# Patient Record
Sex: FEMALE | Race: WHITE | NOT HISPANIC OR LATINO | URBAN - METROPOLITAN AREA
[De-identification: names, ages, dates, MRNs, and addresses within clinical notes are randomized per-mention and may not be internally consistent; named-entity substitution may affect disease eponyms.]

---

## 2017-08-18 ENCOUNTER — IMPORTED ENCOUNTER (OUTPATIENT)
Dept: URBAN - METROPOLITAN AREA CLINIC 50 | Facility: CLINIC | Age: 57
End: 2017-08-18

## 2017-08-24 ENCOUNTER — IMPORTED ENCOUNTER (OUTPATIENT)
Dept: URBAN - METROPOLITAN AREA CLINIC 50 | Facility: CLINIC | Age: 57
End: 2017-08-24

## 2018-05-31 ENCOUNTER — IMPORTED ENCOUNTER (OUTPATIENT)
Dept: URBAN - METROPOLITAN AREA CLINIC 50 | Facility: CLINIC | Age: 58
End: 2018-05-31

## 2019-08-29 ENCOUNTER — IMPORTED ENCOUNTER (OUTPATIENT)
Dept: URBAN - METROPOLITAN AREA CLINIC 50 | Facility: CLINIC | Age: 59
End: 2019-08-29

## 2021-05-15 ASSESSMENT — VISUAL ACUITY
OD_BAT: 20/30
OS_CC: 20/20
OD_OTHER: 20/20. 20/20.
OS_BAT: 20/30
OS_OTHER: 20/30. 20/30.
OD_CC: 20/20
OS_PH: @ 18 IN
OD_CC: 20/20
OS_CC: J1+
OD_CC: J1+
OD_CC: 20/20
OS_CC: J1+
OD_OTHER: 20/30. 20/30.
OS_CC: 20/20
OD_CC: J1+@ 18 IN
OD_PH: @ 18 IN
OS_OTHER: 20/20. 20/25.
OS_CC: 20/25
OD_CC: J1+
OD_BAT: 20/20
OS_BAT: 20/20
OS_CC: J1+@ 18 IN

## 2021-05-15 ASSESSMENT — TONOMETRY
OD_IOP_MMHG: 08
OD_IOP_MMHG: 12
OS_IOP_MMHG: 11
OD_IOP_MMHG: 11
OS_IOP_MMHG: 09
OS_IOP_MMHG: 13

## 2021-06-05 ENCOUNTER — PREPPED CHART (OUTPATIENT)
Dept: URBAN - METROPOLITAN AREA CLINIC 52 | Facility: CLINIC | Age: 61
End: 2021-06-05

## 2021-06-10 ENCOUNTER — COMPREHENSIVE EXAM (OUTPATIENT)
Dept: URBAN - METROPOLITAN AREA CLINIC 52 | Facility: CLINIC | Age: 61
End: 2021-06-10

## 2021-06-10 DIAGNOSIS — H25.13: ICD-10-CM

## 2021-06-10 PROCEDURE — 92015 DETERMINE REFRACTIVE STATE: CPT

## 2021-06-10 PROCEDURE — 92014 COMPRE OPH EXAM EST PT 1/>: CPT

## 2021-06-10 ASSESSMENT — TONOMETRY
OD_IOP_MMHG: 13
OS_IOP_MMHG: 14

## 2021-06-10 ASSESSMENT — VISUAL ACUITY
OS_CC: 20/25+2
OS_GLARE: 20/40
OD_GLARE: 20/40
OU_CC: J1+
OD_GLARE: 20/50
OS_GLARE: 20/40
OD_CC: 20/20

## 2022-06-01 NOTE — PATIENT DISCUSSION
OCT NFL stable OD, GCC OD slight thinning compared to 19'. OCT NFL OS thinner, 1 seg yellow, GCC thinner as well. After IOP check, update HVF. pt leaning toward CV OU. Plan : OS MELINDA first then OD MELINDA.

## 2022-06-01 NOTE — PATIENT DISCUSSION
Pt has been off lumigan for ~ 1 year, IOP has slightly increased since last visit 2019. Restart Lumigan qhs OU, and refill betimol QAM OU.

## 2022-07-01 NOTE — PATIENT DISCUSSION
OCT NFL stable OD, GCC OD slight thinning compared to 19'. OCT NFL OS thinner, 1 seg yellow, GCC thinner as well. After IOP check, update HVF.

## 2022-07-01 NOTE — PATIENT DISCUSSION
7/1/22: Insurance would like timolol filled instead of betimol, send in script. IOP has improved since last visit. Still borderline OHTN. Pt taking medications as prescribed. RTC 4-5 mo for updated [facility] 24-2.

## 2022-07-01 NOTE — PATIENT DISCUSSION
6/2022: Pt has been off lumigan for ~ 1 year, IOP has slightly increased since last visit 2019. Restart Lumigan qhs OU, and refill betimol QAM OU.

## 2022-07-21 ENCOUNTER — COMPREHENSIVE EXAM (OUTPATIENT)
Dept: URBAN - METROPOLITAN AREA CLINIC 52 | Facility: CLINIC | Age: 62
End: 2022-07-21

## 2022-07-21 DIAGNOSIS — H52.03: ICD-10-CM

## 2022-07-21 DIAGNOSIS — H25.13: ICD-10-CM

## 2022-07-21 DIAGNOSIS — H11.041: ICD-10-CM

## 2022-07-21 PROCEDURE — 92014 COMPRE OPH EXAM EST PT 1/>: CPT

## 2022-07-21 PROCEDURE — 92015 DETERMINE REFRACTIVE STATE: CPT

## 2022-07-21 ASSESSMENT — VISUAL ACUITY
OS_GLARE: 20/20
OD_GLARE: 20/30
OD_CC: 20/25-1
OS_CC: 20/25-1
OU_CC: J1+ @ 14IN
OU_CC: 20/25+1
OS_GLARE: 20/30
OD_GLARE: 20/25

## 2022-07-21 ASSESSMENT — TONOMETRY
OD_IOP_MMHG: 09
OS_IOP_MMHG: 10

## 2022-12-09 NOTE — PATIENT DISCUSSION
7/1/22: Insurance would like timolol filled instead of betimol, send in script. IOP has improved since last visit. Still borderline OHTN.

## 2023-11-02 ENCOUNTER — COMPREHENSIVE EXAM (OUTPATIENT)
Dept: URBAN - METROPOLITAN AREA CLINIC 52 | Facility: CLINIC | Age: 63
End: 2023-11-02

## 2023-11-02 DIAGNOSIS — H11.041: ICD-10-CM

## 2023-11-02 DIAGNOSIS — H25.13: ICD-10-CM

## 2023-11-02 PROCEDURE — 99214 OFFICE O/P EST MOD 30 MIN: CPT

## 2023-11-02 PROCEDURE — 92015 DETERMINE REFRACTIVE STATE: CPT

## 2023-11-02 ASSESSMENT — VISUAL ACUITY
OS_CC: 20/25-1
OS_GLARE: 20/40
OD_CC: 20/20
OS_GLARE: 20/30
OU_CC: J1
OD_GLARE: 20/30
OD_GLARE: 20/40

## 2023-11-02 ASSESSMENT — TONOMETRY
OS_IOP_MMHG: 12
OD_IOP_MMHG: 12